# Patient Record
Sex: MALE | Race: WHITE | HISPANIC OR LATINO | Employment: FULL TIME | URBAN - METROPOLITAN AREA
[De-identification: names, ages, dates, MRNs, and addresses within clinical notes are randomized per-mention and may not be internally consistent; named-entity substitution may affect disease eponyms.]

---

## 2018-09-06 ENCOUNTER — HOSPITAL ENCOUNTER (EMERGENCY)
Facility: MEDICAL CENTER | Age: 48
End: 2018-09-06
Attending: EMERGENCY MEDICINE
Payer: COMMERCIAL

## 2018-09-06 ENCOUNTER — APPOINTMENT (OUTPATIENT)
Dept: RADIOLOGY | Facility: MEDICAL CENTER | Age: 48
End: 2018-09-06
Attending: EMERGENCY MEDICINE
Payer: COMMERCIAL

## 2018-09-06 ENCOUNTER — HOSPITAL ENCOUNTER (OUTPATIENT)
Dept: RADIOLOGY | Facility: MEDICAL CENTER | Age: 48
End: 2018-09-06

## 2018-09-06 VITALS
HEIGHT: 69 IN | RESPIRATION RATE: 16 BRPM | WEIGHT: 171.52 LBS | HEART RATE: 76 BPM | BODY MASS INDEX: 25.4 KG/M2 | SYSTOLIC BLOOD PRESSURE: 138 MMHG | DIASTOLIC BLOOD PRESSURE: 86 MMHG | OXYGEN SATURATION: 94 % | TEMPERATURE: 98 F

## 2018-09-06 DIAGNOSIS — S06.6X1A TRAUMATIC SUBARACHNOID HEMORRHAGE WITH LOSS OF CONSCIOUSNESS OF 30 MINUTES OR LESS, INITIAL ENCOUNTER (HCC): ICD-10-CM

## 2018-09-06 PROBLEM — S06.6XAA TRAUMATIC SUBARACHNOID HEMORRHAGE (HCC): Status: ACTIVE | Noted: 2018-09-06

## 2018-09-06 PROBLEM — T14.90XA TRAUMA: Status: ACTIVE | Noted: 2018-09-06

## 2018-09-06 LAB
ABO GROUP BLD: NORMAL
ALBUMIN SERPL BCP-MCNC: 4.1 G/DL (ref 3.2–4.9)
ALBUMIN/GLOB SERPL: 1.4 G/DL
ALP SERPL-CCNC: 72 U/L (ref 30–99)
ALT SERPL-CCNC: 15 U/L (ref 2–50)
ANION GAP SERPL CALC-SCNC: 11 MMOL/L (ref 0–11.9)
APTT PPP: 25.3 SEC (ref 24.7–36)
AST SERPL-CCNC: 19 U/L (ref 12–45)
BILIRUB SERPL-MCNC: 0.5 MG/DL (ref 0.1–1.5)
BLD GP AB SCN SERPL QL: NORMAL
BUN SERPL-MCNC: 20 MG/DL (ref 8–22)
CALCIUM SERPL-MCNC: 9.1 MG/DL (ref 8.5–10.5)
CFT BLD TEG: 3.9 MIN (ref 5–10)
CHLORIDE SERPL-SCNC: 106 MMOL/L (ref 96–112)
CLOT ANGLE BLD TEG: 66.5 DEGREES (ref 53–72)
CLOT LYSIS 30M P MA LENFR BLD TEG: 0 % (ref 0–8)
CO2 SERPL-SCNC: 22 MMOL/L (ref 20–33)
CREAT SERPL-MCNC: 0.94 MG/DL (ref 0.5–1.4)
CT.EXTRINSIC BLD ROTEM: 1.8 MIN (ref 1–3)
ETHANOL BLD-MCNC: 0 G/DL
GLOBULIN SER CALC-MCNC: 3 G/DL (ref 1.9–3.5)
GLUCOSE SERPL-MCNC: 117 MG/DL (ref 65–99)
INR PPP: 1.03 (ref 0.87–1.13)
MAGNESIUM SERPL-MCNC: 2 MG/DL (ref 1.5–2.5)
MCF BLD TEG: 66.1 MM (ref 50–70)
PA AA BLD-ACNC: 89.3 %
PA ADP BLD-ACNC: 88.8 %
PHOSPHATE SERPL-MCNC: 1.7 MG/DL (ref 2.5–4.5)
POTASSIUM SERPL-SCNC: 3.8 MMOL/L (ref 3.6–5.5)
PROT SERPL-MCNC: 7.1 G/DL (ref 6–8.2)
PROTHROMBIN TIME: 13.2 SEC (ref 12–14.6)
RH BLD: NORMAL
SODIUM SERPL-SCNC: 139 MMOL/L (ref 135–145)
TEG ALGORITHM TGALG: ABNORMAL

## 2018-09-06 PROCEDURE — 80053 COMPREHEN METABOLIC PANEL: CPT

## 2018-09-06 PROCEDURE — 85730 THROMBOPLASTIN TIME PARTIAL: CPT

## 2018-09-06 PROCEDURE — 80307 DRUG TEST PRSMV CHEM ANLYZR: CPT

## 2018-09-06 PROCEDURE — 85347 COAGULATION TIME ACTIVATED: CPT

## 2018-09-06 PROCEDURE — 83735 ASSAY OF MAGNESIUM: CPT

## 2018-09-06 PROCEDURE — 86900 BLOOD TYPING SEROLOGIC ABO: CPT

## 2018-09-06 PROCEDURE — 86850 RBC ANTIBODY SCREEN: CPT

## 2018-09-06 PROCEDURE — 85610 PROTHROMBIN TIME: CPT

## 2018-09-06 PROCEDURE — 86901 BLOOD TYPING SEROLOGIC RH(D): CPT

## 2018-09-06 PROCEDURE — 305948 HCHG GREEN TRAUMA ACT PRE-NOTIFY NO CC

## 2018-09-06 PROCEDURE — 85576 BLOOD PLATELET AGGREGATION: CPT | Mod: 91

## 2018-09-06 PROCEDURE — 84100 ASSAY OF PHOSPHORUS: CPT

## 2018-09-06 PROCEDURE — 99284 EMERGENCY DEPT VISIT MOD MDM: CPT

## 2018-09-06 PROCEDURE — 70450 CT HEAD/BRAIN W/O DYE: CPT

## 2018-09-06 PROCEDURE — 85384 FIBRINOGEN ACTIVITY: CPT

## 2018-09-06 RX ORDER — ACETAMINOPHEN 500 MG
1000 TABLET ORAL EVERY 6 HOURS
Status: CANCELLED | OUTPATIENT
Start: 2018-09-06 | End: 2018-09-11

## 2018-09-06 RX ORDER — ENEMA 19; 7 G/133ML; G/133ML
1 ENEMA RECTAL
Status: CANCELLED | OUTPATIENT
Start: 2018-09-06

## 2018-09-06 RX ORDER — POLYETHYLENE GLYCOL 3350 17 G/17G
1 POWDER, FOR SOLUTION ORAL 2 TIMES DAILY
Status: CANCELLED | OUTPATIENT
Start: 2018-09-06

## 2018-09-06 RX ORDER — BISACODYL 10 MG
10 SUPPOSITORY, RECTAL RECTAL
Status: CANCELLED | OUTPATIENT
Start: 2018-09-06

## 2018-09-06 RX ORDER — SODIUM CHLORIDE, SODIUM LACTATE, POTASSIUM CHLORIDE, CALCIUM CHLORIDE 600; 310; 30; 20 MG/100ML; MG/100ML; MG/100ML; MG/100ML
INJECTION, SOLUTION INTRAVENOUS CONTINUOUS
Status: CANCELLED | OUTPATIENT
Start: 2018-09-06

## 2018-09-06 RX ORDER — HYDROCODONE BITARTRATE AND ACETAMINOPHEN 5; 325 MG/1; MG/1
1-2 TABLET ORAL EVERY 4 HOURS PRN
Qty: 16 TAB | Refills: 0 | Status: SHIPPED | OUTPATIENT
Start: 2018-09-06 | End: 2018-09-09

## 2018-09-06 RX ORDER — ONDANSETRON 2 MG/ML
4 INJECTION INTRAMUSCULAR; INTRAVENOUS EVERY 4 HOURS PRN
Status: CANCELLED | OUTPATIENT
Start: 2018-09-06

## 2018-09-06 RX ORDER — OXYCODONE HYDROCHLORIDE 5 MG/1
5 TABLET ORAL
Status: CANCELLED | OUTPATIENT
Start: 2018-09-06

## 2018-09-06 RX ORDER — OXYCODONE HYDROCHLORIDE 5 MG/1
10 TABLET ORAL
Status: CANCELLED | OUTPATIENT
Start: 2018-09-06

## 2018-09-06 RX ORDER — AMOXICILLIN 250 MG
1 CAPSULE ORAL
Status: CANCELLED | OUTPATIENT
Start: 2018-09-06

## 2018-09-06 RX ORDER — DOCUSATE SODIUM 100 MG/1
100 CAPSULE, LIQUID FILLED ORAL 2 TIMES DAILY
Status: CANCELLED | OUTPATIENT
Start: 2018-09-06

## 2018-09-06 RX ORDER — FAMOTIDINE 20 MG/1
20 TABLET, FILM COATED ORAL 2 TIMES DAILY
Status: CANCELLED | OUTPATIENT
Start: 2018-09-06

## 2018-09-06 RX ORDER — AMOXICILLIN 250 MG
1 CAPSULE ORAL NIGHTLY
Status: CANCELLED | OUTPATIENT
Start: 2018-09-06
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
## 2018-09-06 NOTE — DISCHARGE INSTRUCTIONS
"Mild Traumatic Brain Injury  Mild traumatic brain injury (TBI) is damage to brain tissue from a blow to the head or to the body. This blow causes the brain to rapidly move back and forth within the skull. The injury changes the way your brain normally works.  CAUSES  Falls are the most common cause of mild traumatic brain injury. Other causes include motor vehicle accidents and sports-related injuries.  SYMPTOMS  Symptoms depend on the type and extent of the injury. Symptoms can last minutes to hours and may include:  · Scalp swelling. A large bump may develop under the skin.  · Loss of consciousness.  · Fatigue or drowsiness.  · Sleep disturbances including sleeping more or less than usual or having trouble falling asleep.  · Headache.  · Being unable to remember events surrounding the injury (amnesia).  · Confusion, disorientation, or feeling mentally foggy.  · Concentration or memory problems.  · Nausea or vomiting.  · Dizziness.  · Irritability or feeling more emotional.  · Balance problems.  · Visual problems including sensitivity to light.  · Sensitivity to noise.  · Difficulty speaking. You may have slurred speech or a delay when following directions or answering questions.  · Twitching or shaking (seizures).  · Numbness or tingling.  In a few cases, someone with a mild TBI will experience \"post-concussion syndrome.\" Post-concussion syndrome is a group of symptoms that can occur after a head injury. It is characterized by headaches, dizziness, difficulty with concentration or thinking, and problems with mood. These symptoms occur for a few weeks to a few months and usually go away without treatment.   DIAGNOSIS  Your caregiver can usually make the diagnosis of mild TBI by asking you what happened and by your exam. If your caregiver is concerned about a more serious TBI, he or she may ask for testing. Testing may include getting a CT (computed tomography) scan of the brain.   TREATMENT   · Only take medicine " for pain or other symptoms as directed by your caregiver.  · Review your current medicines with your caregiver to make sure it is okay to keep taking them. Do not stop regular medicines unless told to do so.  · If there was a direct blow to your head, you may apply an ice pack to the injured area to reduce pain and swelling.  · Put ice in a plastic bag.  · Place a towel between your skin and the bag.  · Leave the ice on for 10 to 15 minutes every hour while you are awake for up to 48 hours after the injury. Ask your caregiver if you should use ice longer than 48 hours.  HOME CARE INSTRUCTIONS   Almost everyone recovers completely from a mild TBI. You must give your brain and body enough time for recovery. As symptoms decrease, you may begin to gradually return to your daily activities. If symptoms worsen or return, lessen your activities, then try again to increase your activities slowly.   Rest  · Get plenty of sleep at night.  · Avoid staying up late at night.  · Keep the same bedtime hours on weekends and weekdays.  · Rest during the day as needed. Take daytime naps or rest breaks when you feel tired or fatigued.  Brain (Cognitive) Rest  Rest your brain. Limit activities that require a lot of thought or concentration. Those activities can make symptoms worse. Avoid or minimize:  · Computer work.  · Homework or job-related work.  · Watching TV.  · Playing video games.  · Talking on the phone.  · Text messaging.  · Listening to loud music.  · Activities such as balancing a checkbook.  · Making important decisions. If you need to make an important decision, get help from a trusted family member or friend.  Activity  Talk to your caregiver about activities you should avoid until you recover. You may need to avoid some or all of your common activities, such as:  · School.  · Work.  · Driving.  · Air travel.  · Recreation, such as:  · Contact sports.  · Running.  · Riding roller coasters and other high-speed amusement  park rides.  · Bicycling.  · Skiing.  · Ice or inline skating.  · Horseback riding.  · Skateboarding.  · Swimming. If you do go swimming, do not swim by yourself.  · Physical exercise, physical education class, working out, weight training, weightlifting, or heavy lifting.  Nutrition  · Follow a normal diet and fluid intake.  · Avoid or limit alcoholic beverages.  Follow-up Appointments  Keep all follow-up appointments. Repeated evaluation of your symptoms is recommended for your recovery. Ask your caregiver when it will be safe to return to your regular activities. Ask your caregiver for help with written recommendations for your employer. It may be helpful to return to your job gradually.  Return to School or Work  · Inform your teachers, school nurse, school counselor, , , or  about your injury, symptoms, and restrictions. They should be instructed to report:  · Increased problems with attention or concentration.  · Increased problems remembering or learning new information.  · Increased time needed to complete tasks or assignments.  · Increased irritability or decreased ability to cope with stress.  · Increased symptoms.  PREVENTION  Protect your head from future injury. It is very important to avoid another head or brain injury before you have recovered. In rare cases, another injury can lead to permanent brain damage, brain swelling, or death.  · Get a helmet that is fitted correctly. Wear your helmet during activities such as bicycling or horseback riding.  · Wear a seat belt when driving and when you are a passenger.  · Prevent falls in the home by:  · Removing clutter and tripping hazards from floors and stairways.  · Using grab bars in bathrooms and handrails by stairs.  · Placing non-slip mats on floors and in bathtubs.  · Improving lighting in dim areas.  SEEK IMMEDIATE MEDICAL CARE IF:   · You have severe or worsening headaches.  · You have worsening drowsiness or  confusion.  · You cannot recognize people or places.  · You have unusual behavior changes.  · You have unusual restlessness or unsteadiness, or increasing irritability.  · You have a seizure.  · You have vision problems.  · You develop a fever or repeated vomiting.  · You have neck pain or a stiff neck.  · You lose bowel or bladder control.  · You have weakness or numbness in any part of the body.  · You have slurred speech.  MAKE SURE YOU:  · Understand these instructions.  · Will watch your condition.  · Will get help right away if you are not doing well or get worse.  Document Released: 01/20/2012 Document Revised: 03/11/2013 Document Reviewed: 01/20/2012  [employer]® Patient Information ©2014 [employer].
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
## 2018-09-06 NOTE — ED NOTES
.Reviewed discharge instructions, patient verbalized understanding of instructions and medications. States he will schedule follow up appointment as needed. Denies further questions at this time.  Patient ambulatory out of ER with steady gait with spouse.  Patient AOx4

## 2018-09-07 ENCOUNTER — APPOINTMENT (OUTPATIENT)
Dept: RADIOLOGY | Facility: MEDICAL CENTER | Age: 48
End: 2018-09-07
Attending: EMERGENCY MEDICINE
Payer: COMMERCIAL

## 2018-09-07 ENCOUNTER — HOSPITAL ENCOUNTER (EMERGENCY)
Facility: MEDICAL CENTER | Age: 48
End: 2018-09-07
Attending: EMERGENCY MEDICINE
Payer: COMMERCIAL

## 2018-09-07 VITALS
SYSTOLIC BLOOD PRESSURE: 118 MMHG | WEIGHT: 162.92 LBS | DIASTOLIC BLOOD PRESSURE: 72 MMHG | HEART RATE: 80 BPM | TEMPERATURE: 97.8 F | BODY MASS INDEX: 24.13 KG/M2 | HEIGHT: 69 IN | RESPIRATION RATE: 15 BRPM | OXYGEN SATURATION: 98 %

## 2018-09-07 DIAGNOSIS — I60.9 SAH (SUBARACHNOID HEMORRHAGE) (HCC): ICD-10-CM

## 2018-09-07 PROCEDURE — 99284 EMERGENCY DEPT VISIT MOD MDM: CPT

## 2018-09-07 PROCEDURE — 70450 CT HEAD/BRAIN W/O DYE: CPT

## 2018-09-07 NOTE — ED NOTES
Pt to rm 13 from triage.  Agree with triage note.  Pt states he needs a Doctors note to stay out of work.  Pt states he tried to go to work today but had continued headache and dizziness.  Seen here yesterday for SAH and discharged.  Pt states no better today
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
## 2018-09-08 NOTE — ED NOTES
Pt discharged home as ordered by erp. Pt instructed to follow up with his PCP and return here as needed. Pt encouraged to return here as needed. Pt left ambulating independently with family

## 2018-09-08 NOTE — DISCHARGE INSTRUCTIONS
Subarachnoid Hemorrhage  Subarachnoid hemorrhage is bleeding in the area between the brain and the membrane that covers the brain (subarachnoid space). This increases the pressure on the brain and causes some areas of the brain to be deprived of blood flow. Subarachnoid hemorrhage is a medical emergency that may cause permanent brain damage, stroke, or even death if not treated.  What are the causes?  · Head injury.  · Ruptured brain aneurysm.  · Bleeding from blood vessels that develop abnormally (arteriovenous malformation).  · Bleeding disorder.  · Use of blood thinners (anticoagulants).  · Use of certain drugs, such as cocaine.  For some people with subarachnoid hemorrhage, the cause is unknown.  What increases the risk?  · Smoking.  · Having high blood pressure (hypertension).  · Abusing alcohol.  · Being a female, especially being of post-menopausal age.  · Having a family history of disease in the blood vessels of the brain (cerebrovascular disease).  · Having certain genetic syndromes that result in kidney disease or connective tissue disease.  What are the signs or symptoms?  · A sudden, severe headache with no known cause. The headache is often described as the worst headache ever experienced.  · Nausea or vomiting, especially when combined with other symptoms such as a headache.  · Sudden weakness or numbness of the face, arm, or leg, especially on one side of the body.  · Sudden trouble walking or difficulty moving arms or legs.  · Sudden confusion.  · Sudden personality changes.  · Trouble speaking (aphasia) or understanding.  · Difficulty swallowing.  · Sudden trouble seeing in one or both eyes.  · Double vision.  · Dizziness.  · Loss of balance or coordination.  · Intolerance to light.  · Stiff neck.  How is this diagnosed?  Your health care provider will perform a physical exam and ask about your symptoms. If a subarachnoid hemorrhage is suspected, various tests may be ordered. These tests may  include:  · A CT scan.  · An MRI.  · A cerebral angiogram.  · A spinal tap (lumbar puncture).  · Blood tests.  How is this treated?  Immediate treatment in the hospital is often required to reduce the risk of brain damage. Treatment will depend on the cause of the bleeding, where it is located, and the extent of the bleeding and damage. The goals of treatment include stopping the bleeding, repairing the cause of bleeding, providing relief of symptoms, and preventing problems.  · Medicines may be given to:  ¨ Lower blood pressure (antihypertensives).  ¨ Relieve pain (analgesics).  ¨ Relieve nausea or vomiting.  · Surgery may also be needed to stop the bleeding, repair the cause of the bleeding, or remove the blood.  · Rehabilitation may be needed to improve any cognitive and day-to-day functions impaired by the condition.  Further treatment depends on the duration, severity, and cause of your symptoms. Physical, speech, and occupational therapists will assess you and work to improve any functions impaired by the subarachnoid hemorrhage. Measures will be taken to prevent short-term and long-term problems, including infection from breathing foreign material into the lungs (aspiration pneumonia), blood clots in the legs, bedsores, and falls.  Follow these instructions at home:  After your hospitalization or inpatient rehabilitation is completed and you are well enough to go home, it is important to prevent a reoccurrence. Take these steps to help prevent this:  · Take medicines only as directed by your health care provider.  · If swallow studies have determined that your swallowing reflex is present, you should eat healthy foods. A diet low in salt (sodium), saturated fat, trans fat, and cholesterol may be recommended to manage high blood pressure. Foods may need to be a special consistency (soft or pureed), or small bites may need to be taken in order to avoid aspirating or choking.  · Rest and limit activities or  movements as directed by your health care provider.  · Do not use any tobacco products including cigarettes, chewing tobacco, or electronic cigarettes. If you need help quitting, ask your health care provider.  · Limit alcohol intake to no more than 1 drink per day for nonpregnant women and 2 drinks per day for men. One drink equals 12 ounces of beer, 5 ounces of wine, or 1½ ounces of hard liquor.  · Make any other lifestyle changes as directed by your health care provider.  · Monitor and record your blood pressure as directed by your health care provider.  · A safe home environment is important to reduce the risk of falls. Your health care provider may arrange for specialists to evaluate your home. Having grab bars in the bedroom and bathroom is often important. Your health care provider may arrange for special equipment to be used at home, such as raised toilets and a seat for the shower.  · Physical, occupational, and speech therapy. Ongoing therapy may be needed to maximize your recovery after a subarachnoid bleed. If you have been advised to use a walker or a cane, use it at all times. Be sure to keep your therapy appointments.  · Keep all follow-up visits with your health care provider and other specialists. This includes any referrals, physical therapy, and rehabilitation.  Get help right away if:  · You suddenly have a sudden, severe headache with no known cause.  · You have nausea or vomiting occurring with another symptom.  · You have sudden weakness or numbness of the face, arm, or leg, especially on one side of the body.  · You have sudden trouble walking or difficulty moving arms or legs.  · You have sudden confusion.  · You have trouble speaking (aphasia) or understanding.  · You have sudden trouble seeing in one or both eyes.  · You have a sudden loss of balance or coordination.  · You have a stiff neck.  · You have difficulty breathing.  · You have a partial or total loss of consciousness.  Any of  these symptoms may represent a serious problem that is an emergency. Do not wait to see if the symptoms will go away. Get medical help right away. Call your local emergency services (911 in [place] Do not drive yourself to the hospital.   This information is not intended to replace advice given to you by your health care provider. Make sure you discuss any questions you have with your health care provider.  Document Released: 11/04/2005 Document Revised: 05/25/2017 Document Reviewed: 01/31/2014  Elsevier Interactive Patient Education © 2017 Elsevier Inc.

## 2018-09-17 ENCOUNTER — HOSPITAL ENCOUNTER (EMERGENCY)
Facility: MEDICAL CENTER | Age: 48
End: 2018-09-17
Attending: EMERGENCY MEDICINE
Payer: COMMERCIAL

## 2018-09-17 ENCOUNTER — APPOINTMENT (OUTPATIENT)
Dept: RADIOLOGY | Facility: MEDICAL CENTER | Age: 48
End: 2018-09-17
Attending: EMERGENCY MEDICINE
Payer: COMMERCIAL

## 2018-09-17 VITALS
DIASTOLIC BLOOD PRESSURE: 78 MMHG | BODY MASS INDEX: 26.12 KG/M2 | TEMPERATURE: 97.7 F | RESPIRATION RATE: 16 BRPM | HEIGHT: 67 IN | SYSTOLIC BLOOD PRESSURE: 123 MMHG | WEIGHT: 166.45 LBS | HEART RATE: 74 BPM | OXYGEN SATURATION: 98 %

## 2018-09-17 DIAGNOSIS — S39.012A STRAIN OF LUMBAR REGION, INITIAL ENCOUNTER: ICD-10-CM

## 2018-09-17 DIAGNOSIS — S06.6X1D TRAUMATIC SUBARACHNOID HEMORRHAGE WITH LOSS OF CONSCIOUSNESS OF 30 MINUTES OR LESS, SUBSEQUENT ENCOUNTER: ICD-10-CM

## 2018-09-17 PROCEDURE — 72100 X-RAY EXAM L-S SPINE 2/3 VWS: CPT

## 2018-09-17 PROCEDURE — 99283 EMERGENCY DEPT VISIT LOW MDM: CPT

## 2018-09-17 ASSESSMENT — PAIN SCALES - GENERAL: PAINLEVEL_OUTOF10: 1

## 2018-09-17 ASSESSMENT — LIFESTYLE VARIABLES: DO YOU DRINK ALCOHOL: NO
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
## 2018-09-17 NOTE — ED NOTES
Pt ambulated to lobby for d/c. Skin WDI, RR E/U, NAD. Pt verbalizes understanding of d/c instructions and follow up information.
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
## 2025-01-14 ENCOUNTER — APPOINTMENT (OUTPATIENT)
Dept: URBAN - METROPOLITAN AREA CLINIC 22 | Facility: CLINIC | Age: 55
Setting detail: DERMATOLOGY
End: 2025-01-14

## 2025-01-14 DIAGNOSIS — Z71.89 OTHER SPECIFIED COUNSELING: ICD-10-CM

## 2025-01-14 DIAGNOSIS — B35.2 TINEA MANUUM: ICD-10-CM

## 2025-01-14 DIAGNOSIS — D22 MELANOCYTIC NEVI: ICD-10-CM

## 2025-01-14 DIAGNOSIS — L82.1 OTHER SEBORRHEIC KERATOSIS: ICD-10-CM

## 2025-01-14 DIAGNOSIS — D18.0 HEMANGIOMA: ICD-10-CM

## 2025-01-14 DIAGNOSIS — L81.4 OTHER MELANIN HYPERPIGMENTATION: ICD-10-CM

## 2025-01-14 PROBLEM — D18.01 HEMANGIOMA OF SKIN AND SUBCUTANEOUS TISSUE: Status: ACTIVE | Noted: 2025-01-14

## 2025-01-14 PROBLEM — D22.9 MELANOCYTIC NEVI, UNSPECIFIED: Status: ACTIVE | Noted: 2025-01-14

## 2025-01-14 PROCEDURE — ? PRESCRIPTION

## 2025-01-14 PROCEDURE — 99203 OFFICE O/P NEW LOW 30 MIN: CPT

## 2025-01-14 PROCEDURE — ? COUNSELING

## 2025-01-14 PROCEDURE — ? KOH PREP

## 2025-01-14 RX ADMIN — KETOCONAZOLE: 20 CREAM TOPICAL at 00:00

## 2025-01-14 ASSESSMENT — LOCATION ZONE DERM: LOCATION ZONE: HAND

## 2025-01-14 ASSESSMENT — LOCATION DETAILED DESCRIPTION DERM: LOCATION DETAILED: LEFT DORSAL SMALL METACARPOPHALANGEAL JOINT

## 2025-01-14 ASSESSMENT — LOCATION SIMPLE DESCRIPTION DERM: LOCATION SIMPLE: LEFT HAND

## 2025-01-15 RX ORDER — KETOCONAZOLE 20 MG/G
CREAM TOPICAL
Qty: 60 | Refills: 2 | Status: ERX | COMMUNITY
Start: 2025-01-14

## 2025-03-13 ENCOUNTER — RX ONLY (RX ONLY)
Age: 55
End: 2025-03-13

## 2025-03-13 RX ORDER — KETOCONAZOLE 20 MG/G
CREAM TOPICAL
Qty: 60 | Refills: 2 | Status: ERX | COMMUNITY
Start: 2025-03-13